# Patient Record
Sex: MALE | Race: BLACK OR AFRICAN AMERICAN | URBAN - METROPOLITAN AREA
[De-identification: names, ages, dates, MRNs, and addresses within clinical notes are randomized per-mention and may not be internally consistent; named-entity substitution may affect disease eponyms.]

---

## 2019-05-08 ENCOUNTER — NEW PATIENT (OUTPATIENT)
Dept: URBAN - METROPOLITAN AREA CLINIC 51 | Facility: CLINIC | Age: 64
End: 2019-05-08
Payer: MEDICARE

## 2019-05-08 DIAGNOSIS — H35.373 PUCKERING OF MACULA, BILATERAL: ICD-10-CM

## 2019-05-08 DIAGNOSIS — Z79.84 LONG TERM (CURRENT) USE OF ORAL ANTIDIABETIC DRUGS: ICD-10-CM

## 2019-05-08 DIAGNOSIS — H43.813 VITREOUS DEGENERATION, BILATERAL: ICD-10-CM

## 2019-05-08 DIAGNOSIS — E11.9 TYPE 2 DIABETES MELLITUS WITHOUT COMPLICATIONS: ICD-10-CM

## 2019-05-08 DIAGNOSIS — H52.4 PRESBYOPIA: ICD-10-CM

## 2019-05-08 PROCEDURE — 92250 FUNDUS PHOTOGRAPHY W/I&R: CPT | Performed by: OPTOMETRIST

## 2019-05-08 PROCEDURE — 92015 DETERMINE REFRACTIVE STATE: CPT | Performed by: OPTOMETRIST

## 2019-05-08 PROCEDURE — 92004 COMPRE OPH EXAM NEW PT 1/>: CPT | Performed by: OPTOMETRIST

## 2019-05-08 ASSESSMENT — VISUAL ACUITY
OD: 20/25
OS: 20/25

## 2019-05-08 ASSESSMENT — INTRAOCULAR PRESSURE
OD: 16
OS: 16

## 2019-05-08 ASSESSMENT — KERATOMETRY
OS: 45.20
OD: 44.84

## 2021-02-19 ENCOUNTER — OFFICE VISIT (OUTPATIENT)
Dept: URBAN - METROPOLITAN AREA CLINIC 45 | Facility: CLINIC | Age: 66
End: 2021-02-19
Payer: MEDICARE

## 2021-02-19 DIAGNOSIS — E11.3292 TYPE 2 DIAB W MILD NONPRLF DIABETIC RTNOP W/O MACULAR EDEMA, LEFT EYE: ICD-10-CM

## 2021-02-19 DIAGNOSIS — H25.813 COMBINED FORMS OF AGE-RELATED CATARACT, BILATERAL: ICD-10-CM

## 2021-02-19 DIAGNOSIS — H40.033 ANATOMICAL NARROW ANGLE, BILATERAL: Primary | ICD-10-CM

## 2021-02-19 PROCEDURE — 99204 OFFICE O/P NEW MOD 45 MIN: CPT | Performed by: OPTOMETRIST

## 2021-02-19 PROCEDURE — 92134 CPTRZ OPH DX IMG PST SGM RTA: CPT | Performed by: OPTOMETRIST

## 2021-02-19 PROCEDURE — 92020 GONIOSCOPY: CPT | Performed by: OPTOMETRIST

## 2021-02-19 ASSESSMENT — KERATOMETRY
OD: 44.88
OS: 45.38

## 2021-02-19 ASSESSMENT — VISUAL ACUITY
OD: 20/40
OS: 20/30

## 2021-02-19 ASSESSMENT — INTRAOCULAR PRESSURE
OD: 16
OS: 16

## 2021-02-19 NOTE — IMPRESSION/PLAN
Impression: Type 2 diab w mild nonprlf diabetic rtnop w/o macular edema, left eye: W54.6637. Plan: Diabetes type II: mild background diabetic retinopathy, no signs of neovascularization noted. No treatment necessary at this time. Patient was instructed to monitor vision for sudden changes and to call if visual changes noted. Discussed ocular and systemic benefits of blood sugar control. 

oct is normal ou, no edema

## 2022-04-27 ENCOUNTER — OFFICE VISIT (OUTPATIENT)
Dept: URBAN - METROPOLITAN AREA CLINIC 43 | Facility: CLINIC | Age: 67
End: 2022-04-27
Payer: MEDICARE

## 2022-04-27 DIAGNOSIS — H40.033 ANATOMICAL NARROW ANGLE, BILATERAL: Primary | ICD-10-CM

## 2022-04-27 PROCEDURE — 99213 OFFICE O/P EST LOW 20 MIN: CPT | Performed by: OPTOMETRIST

## 2022-04-27 PROCEDURE — 92020 GONIOSCOPY: CPT | Performed by: OPTOMETRIST

## 2022-04-27 ASSESSMENT — VISUAL ACUITY
OS: 20/20
OD: 20/20

## 2022-04-27 ASSESSMENT — INTRAOCULAR PRESSURE
OD: 15
OS: 15

## 2022-04-27 ASSESSMENT — KERATOMETRY
OD: 44.75
OS: 44.88

## 2022-04-27 NOTE — IMPRESSION/PLAN
Impression: Anatomical narrow angle, bilateral: H40.033.  Plan: narrow OU, borderline occludable with gonio, IOP normotensive and healthy ONHs, refer for LPI consult then back for CE

## 2022-07-27 ENCOUNTER — OFFICE VISIT (OUTPATIENT)
Dept: URBAN - METROPOLITAN AREA CLINIC 44 | Facility: CLINIC | Age: 67
End: 2022-07-27
Payer: MEDICARE

## 2022-07-27 DIAGNOSIS — H40.033 ANATOMICAL NARROW ANGLE, BILATERAL: Primary | ICD-10-CM

## 2022-07-27 PROCEDURE — 92133 CPTRZD OPH DX IMG PST SGM ON: CPT | Performed by: OPHTHALMOLOGY

## 2022-07-27 PROCEDURE — 76514 ECHO EXAM OF EYE THICKNESS: CPT | Performed by: OPHTHALMOLOGY

## 2022-07-27 PROCEDURE — 99204 OFFICE O/P NEW MOD 45 MIN: CPT | Performed by: OPHTHALMOLOGY

## 2022-07-27 PROCEDURE — 92083 EXTENDED VISUAL FIELD XM: CPT | Performed by: OPHTHALMOLOGY

## 2022-07-27 PROCEDURE — 92020 GONIOSCOPY: CPT | Performed by: OPHTHALMOLOGY

## 2022-07-27 RX ORDER — PREDNISOLONE ACETATE 10 MG/ML
1 % SUSPENSION/ DROPS OPHTHALMIC
Qty: 15 | Refills: 1 | Status: ACTIVE
Start: 2022-07-27

## 2022-07-27 ASSESSMENT — INTRAOCULAR PRESSURE
OS: 18
OD: 18

## 2022-07-27 NOTE — IMPRESSION/PLAN
Impression: Anatomical narrow angle, bilateral: H40.033. Plan: PT IS AT RISK FOR  NAG  OU      GONIO GRADE: ANT TM OU (7/27/22)    PACHS:   470/820 (07/27/22) REFERRED BY DR. Will Jean 
PT DENIES SULFA ALLERGY
PT DENIES LUNG DZ
TARGET IOP IS LOW TEENS OR LESS OU 
RECOMMEND 1. AGREE WITH DR. Will Jean THAT PT IS A RISK FOR ANGLE CLOSURE 2. RECOMMEND LPI  TO AVOID ANGLE CLOSURE. DISCUSSED RISKS VS BENEFITS OF LPI. PT IS AWARE THAT THERE IS A 50% CHANCE THAT THEY MAY REQUIRE ENLARGEMENT OF THE LPI TO CREATE AN IDEAL SIZED IRIDOTOMY. PT MAY  EXPERIENCE AN INFERIOR FLOATER FOLLOWING THE LPI PROCEDURE. THE PT IS AWARE THAT IF ANGLE CLOSURE OCCURS, THEY MAY EXPERIENCE  SIGNIFICANT VISION LOSS AND POSSIBLY COMPLETE LOSS OF ALL SIGHT IN THE AFFECTED EYE. 3. PT WISHES TO PROCEED W  LPI OU 07/27/22
4. ON 07/27/22 THE PT WAS GIVEN WRITTEN LITERATURE ON THE NATURE OF ANGLE CLOSURE 5. THE PT IS AWARE THAT THEY MAY REQUIRE LONG-TERM USE OF EYEDROP MEDICATIONS EVEN AFTER SUCCESSFUL LPI 6.  SCHEDULE LPI OD THEN OS